# Patient Record
Sex: FEMALE | Race: WHITE | NOT HISPANIC OR LATINO
[De-identification: names, ages, dates, MRNs, and addresses within clinical notes are randomized per-mention and may not be internally consistent; named-entity substitution may affect disease eponyms.]

---
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
## 2021-01-20 ENCOUNTER — APPOINTMENT (OUTPATIENT)
Dept: PEDIATRICS | Facility: CLINIC | Age: 21
End: 2021-01-20
Payer: COMMERCIAL

## 2021-01-20 VITALS
BODY MASS INDEX: 27.16 KG/M2 | DIASTOLIC BLOOD PRESSURE: 60 MMHG | WEIGHT: 171 LBS | HEIGHT: 66.5 IN | SYSTOLIC BLOOD PRESSURE: 102 MMHG

## 2021-01-20 PROCEDURE — 99395 PREV VISIT EST AGE 18-39: CPT

## 2021-01-20 PROCEDURE — 99072 ADDL SUPL MATRL&STAF TM PHE: CPT

## 2021-01-20 RX ORDER — MUPIROCIN 20 MG/G
2 OINTMENT TOPICAL TWICE DAILY
Qty: 1 | Refills: 2 | Status: COMPLETED | COMMUNITY
Start: 2020-06-04 | End: 2021-01-20

## 2021-01-20 RX ORDER — AZITHROMYCIN 250 MG/1
250 TABLET, FILM COATED ORAL
Qty: 1 | Refills: 0 | Status: COMPLETED | COMMUNITY
Start: 2020-03-27 | End: 2021-01-20

## 2021-01-20 NOTE — HISTORY OF PRESENT ILLNESS
[Painful Cramps] : no painful cramps [Eats meals with family] : does not eat meals with family [Has friends] : does not have friends [Uses electronic nicotine delivery system] : does not use electronic nicotine delivery system [Uses tobacco] : does not use tobacco [Uses drugs] : does not use drugs  [Drinks alcohol] : does not drink alcohol [Impaired/distracted driving] : no impaired/distracted driving [de-identified] : E [de-identified] : [place] [de-identified] : occsssionally [FreeTextEntry1] : 21 yo for HM visit, immunizations UTD

## 2021-01-20 NOTE — DISCUSSION/SUMMARY
[Transferred health records?] : Transferred health records? No [FreeTextEntry1] : 19 yo for  visit immunizations UTD\par no desire to transition, comfortable here\par PE appears overweight\par exam is otherwise unremarkable\par Discussed 5210 as diet plan( willing to try)\par Continue Covid precautions\par Questions answered\par

## 2021-03-19 ENCOUNTER — APPOINTMENT (OUTPATIENT)
Dept: PEDIATRICS | Facility: CLINIC | Age: 21
End: 2021-03-19
Payer: COMMERCIAL

## 2021-03-19 VITALS — TEMPERATURE: 98.7 F

## 2021-03-19 DIAGNOSIS — S86.812A STRAIN OF OTHER MUSCLE(S) AND TENDON(S) AT LOWER LEG LEVEL, LEFT LEG, INITIAL ENCOUNTER: ICD-10-CM

## 2021-03-19 PROCEDURE — 99072 ADDL SUPL MATRL&STAF TM PHE: CPT

## 2021-03-19 PROCEDURE — 99213 OFFICE O/P EST LOW 20 MIN: CPT

## 2021-03-19 NOTE — PHYSICAL EXAM
[No Acute Distress] : no acute distress [Alert] : alert [Normocephalic] : normocephalic [Luke: ____] : Luke [unfilled] [Normal External Genitalia] : normal external genitalia [Moves All Extremities x 4] : moves all extremities x4 [NL] : warm [Warm] : warm [Dry] : dry [de-identified] : many localized areas of spasms of L ant tibial muscle

## 2021-03-19 NOTE — DISCUSSION/SUMMARY
[FreeTextEntry1] : L knee pain for x 1 week during and after running\par running x 2 mos\par PE unremarkable except for\par palpable areas of localized spasms along L Ant Tibial Muscle from origin to insertion\par mild pain on palpation of of menisci of both knees\par  showed anatomical pictures of muscles of anterior foreleg\par suggest NSAIDs ice after running warm moist compresses at night\par refer for PT\par If symptoms worsen or concerned, call/return to office.\par Questions answered.\par

## 2021-03-19 NOTE — REVIEW OF SYSTEMS
[Negative] : Genitourinary [FreeTextEntry1] : many localized areas of  spasms of L ant tibial muscle

## 2021-03-19 NOTE — HISTORY OF PRESENT ILLNESS
[FreeTextEntry6] : L knee pain x 1 week pain starts at knee and radiates towards ankle\par started running 2  mos ago

## 2021-04-19 ENCOUNTER — APPOINTMENT (OUTPATIENT)
Dept: PEDIATRICS | Facility: CLINIC | Age: 21
End: 2021-04-19
Payer: COMMERCIAL

## 2021-04-19 VITALS — WEIGHT: 166 LBS | TEMPERATURE: 97.6 F

## 2021-04-19 DIAGNOSIS — J02.9 ACUTE PHARYNGITIS, UNSPECIFIED: ICD-10-CM

## 2021-04-19 DIAGNOSIS — J35.8 OTHER CHRONIC DISEASES OF TONSILS AND ADENOIDS: ICD-10-CM

## 2021-04-19 PROCEDURE — 99072 ADDL SUPL MATRL&STAF TM PHE: CPT

## 2021-04-19 PROCEDURE — 99213 OFFICE O/P EST LOW 20 MIN: CPT

## 2021-04-19 NOTE — PHYSICAL EXAM
[Nonerythematous Oropharynx] : nonerythematous oropharynx [NL] : warm [de-identified] : 2 tonsilloliths apex L tonsil

## 2021-04-19 NOTE — DISCUSSION/SUMMARY
[FreeTextEntry1] : sore throat,white spots on tonsils\par PE unremarkable except for 2 tonsilloliths apex L tonsil\par tried flicking them out, unsuccessful\par reassured they are harmless except for Halitosis\par Suggest eating crusty bread or hard pretzels\par gargling may help\par Continue Covid precautions\par Questions answered\par

## 2021-06-10 ENCOUNTER — TRANSCRIPTION ENCOUNTER (OUTPATIENT)
Age: 21
End: 2021-06-10

## 2021-09-20 ENCOUNTER — TRANSCRIPTION ENCOUNTER (OUTPATIENT)
Age: 21
End: 2021-09-20

## 2021-09-29 ENCOUNTER — APPOINTMENT (OUTPATIENT)
Dept: PEDIATRICS | Facility: CLINIC | Age: 21
End: 2021-09-29
Payer: COMMERCIAL

## 2021-09-29 VITALS — TEMPERATURE: 97.7 F | WEIGHT: 160 LBS

## 2021-09-29 DIAGNOSIS — J18.9 PNEUMONIA, UNSPECIFIED ORGANISM: ICD-10-CM

## 2021-09-29 PROCEDURE — 99214 OFFICE O/P EST MOD 30 MIN: CPT

## 2021-09-29 NOTE — PHYSICAL EXAM
[NL] : nonerythematous oropharynx [Regular Rate and Rhythm] : regular rate and rhythm [Normal S1, S2 audible] : normal S1, S2 audible [No Murmurs] : no murmurs [FreeTextEntry5] : conjunctiva clear  [FreeTextEntry7] : slightly diminished breath sounds over left lower lung field [FreeTextEntry8] : pain reproducible when palpating L side of rib cage

## 2021-09-29 NOTE — HISTORY OF PRESENT ILLNESS
[FreeTextEntry6] : Diagnosed with COVID 10 days ago; today is day 10 of quarantine.  Has been experiencing rib pain especially worse when coughing, more prominent over L side .  Had a CXR when first diagnosed with COVID that was normal.  \par \par Had congestion, cough and fever x 2 days at beginning of illness.  Now has persistent cough.

## 2021-09-29 NOTE — REVIEW OF SYSTEMS
[Nasal Congestion] : nasal congestion [Chest Pain] : chest pain [Cough] : cough [Negative] : Genitourinary

## 2021-10-05 ENCOUNTER — NON-APPOINTMENT (OUTPATIENT)
Age: 21
End: 2021-10-05

## 2021-10-05 DIAGNOSIS — R07.81 PLEURODYNIA: ICD-10-CM

## 2021-12-08 ENCOUNTER — APPOINTMENT (OUTPATIENT)
Dept: PEDIATRICS | Facility: CLINIC | Age: 21
End: 2021-12-08

## 2021-12-16 ENCOUNTER — APPOINTMENT (OUTPATIENT)
Dept: PEDIATRICS | Facility: CLINIC | Age: 21
End: 2021-12-16
Payer: COMMERCIAL

## 2021-12-16 PROCEDURE — 90686 IIV4 VACC NO PRSV 0.5 ML IM: CPT

## 2021-12-16 PROCEDURE — 90471 IMMUNIZATION ADMIN: CPT

## 2022-03-23 ENCOUNTER — APPOINTMENT (OUTPATIENT)
Dept: PEDIATRICS | Facility: CLINIC | Age: 22
End: 2022-03-23
Payer: COMMERCIAL

## 2022-03-23 VITALS — WEIGHT: 161 LBS | TEMPERATURE: 96.8 F

## 2022-03-23 DIAGNOSIS — J32.9 CHRONIC SINUSITIS, UNSPECIFIED: ICD-10-CM

## 2022-03-23 DIAGNOSIS — H69.82 OTHER SPECIFIED DISORDERS OF EUSTACHIAN TUBE, LEFT EAR: ICD-10-CM

## 2022-03-23 PROCEDURE — 99214 OFFICE O/P EST MOD 30 MIN: CPT

## 2022-03-23 NOTE — PHYSICAL EXAM
[No Acute Distress] : no acute distress [Alert] : alert [Normocephalic] : normocephalic [EOMI] : EOMI [Clear TM bilaterally] : clear tympanic membranes bilaterally [Cerumen in canal] : cerumen in canal [Nontender Cervical Lymph Nodes] : nontender cervical lymph nodes [Clear to Auscultation Bilaterally] : clear to auscultation bilaterally [Tachycardia] : tachycardia [Regular Rate and Rhythm] : regular rate and rhythm [Soft] : soft [NonTender] : non tender [Non Distended] : non distended [Normal Bowel Sounds] : normal bowel sounds [No Hepatosplenomegaly] : no hepatosplenomegaly [NL] : warm [FreeTextEntry5] : allergic shiners [FreeTextEntry4] : nasal congestion, [de-identified] : pnd

## 2022-03-23 NOTE — HISTORY OF PRESENT ILLNESS
[EENT/Resp Symptoms] : EENT/RESPIRATORY SYMPTOMS [Runny nose] : runny nose [Nasal congestion] : nasal congestion [Chest congestion] : chest congestion [Cough] : cough [Clear rhinorrhea] : clear rhinorrhea [Wet cough] : wet cough [Dry cough] : dry cough [FreeTextEntry6] : sick, congested  x 2 weeks, Rx Day/ Night-quil Sudafed,Zyrtec,Claritin, Tylenol,humidifier

## 2022-03-23 NOTE — DISCUSSION/SUMMARY
[FreeTextEntry1] : sick,congestion, HA x 2 weeks,tried various OTC med no relief, ears popping\par PE afebrile, congestion\par allergic shiners\par TMs appear normal\par nasal congestion\par PND\par Chest CTA\par Sinuitis( 2 week Hx of congestion) Dysfunction of Eustachian Rube\par Suggest, Humidifier, Fluticasone nasal, Bromfed DM Augmentin 875\par If symptoms worsen or concerned, call/return to office.\par Questions answered.\par \par

## 2022-08-22 ENCOUNTER — APPOINTMENT (OUTPATIENT)
Dept: PEDIATRICS | Facility: CLINIC | Age: 22
End: 2022-08-22

## 2022-08-22 DIAGNOSIS — L73.2 HIDRADENITIS SUPPURATIVA: ICD-10-CM

## 2022-08-22 PROCEDURE — 99214 OFFICE O/P EST MOD 30 MIN: CPT

## 2022-08-22 RX ORDER — AMOXICILLIN AND CLAVULANATE POTASSIUM 875; 125 MG/1; MG/1
875-125 TABLET, COATED ORAL
Qty: 1 | Refills: 0 | Status: COMPLETED | COMMUNITY
Start: 2022-03-23 | End: 2022-08-22

## 2022-08-22 RX ORDER — AZITHROMYCIN 250 MG/1
250 TABLET, FILM COATED ORAL
Qty: 1 | Refills: 0 | Status: COMPLETED | COMMUNITY
Start: 2021-09-29 | End: 2022-08-22

## 2022-08-22 NOTE — DISCUSSION/SUMMARY
[FreeTextEntry1] : 20 yo w rash in axillae x 1 week\par shaved axillae last week developed painful rash\par PE unremarkable except for infection of axillae L>R\par Suggest Doxycycline 100 bid x 10 days, warm compresses\par discussed cause of infection\par instructed to clean area w Betadine prior to shaving\par If symptoms worsen or concerned, call/return to office.\par Questions answered.\par

## 2022-08-29 NOTE — REVIEW OF SYSTEMS
From: Zuly Robles  To: Aquilino Motta  Sent: 8/29/2022 11:44 AM CDT  Subject: Appointment to go over next steps    Hi Dr. Motta,     I just had another MRI done that Dr. Rich requested. I wanted to make an appointment with you to go over next steps. The earliest they said is September 13th. I am going on vacation for several weeks starting next week and wanted to see if I could get in to see you this week.    I finish up with my latest round of PT this Friday. I have had 2 trigger point injections by Dr. Rich.     Let me know if you can fit me in this week.    Thanks.    Zuly Robles   [Ear Pain] : ear pain [Nasal Discharge] : nasal discharge [Nasal Congestion] : nasal congestion [Negative] : Genitourinary

## 2022-12-01 ENCOUNTER — APPOINTMENT (OUTPATIENT)
Dept: PEDIATRICS | Facility: CLINIC | Age: 22
End: 2022-12-01

## 2022-12-01 VITALS
DIASTOLIC BLOOD PRESSURE: 64 MMHG | HEIGHT: 67 IN | WEIGHT: 148 LBS | BODY MASS INDEX: 23.23 KG/M2 | SYSTOLIC BLOOD PRESSURE: 118 MMHG

## 2022-12-01 DIAGNOSIS — Z23 ENCOUNTER FOR IMMUNIZATION: ICD-10-CM

## 2022-12-01 DIAGNOSIS — Z00.00 ENCOUNTER FOR GENERAL ADULT MEDICAL EXAMINATION W/OUT ABNORMAL FINDINGS: ICD-10-CM

## 2022-12-01 PROCEDURE — 96127 BRIEF EMOTIONAL/BEHAV ASSMT: CPT

## 2022-12-01 PROCEDURE — 90471 IMMUNIZATION ADMIN: CPT

## 2022-12-01 PROCEDURE — 90686 IIV4 VACC NO PRSV 0.5 ML IM: CPT

## 2022-12-01 PROCEDURE — 90715 TDAP VACCINE 7 YRS/> IM: CPT

## 2022-12-01 PROCEDURE — 99173 VISUAL ACUITY SCREEN: CPT | Mod: 59

## 2022-12-01 PROCEDURE — 90472 IMMUNIZATION ADMIN EACH ADD: CPT

## 2022-12-01 PROCEDURE — 96160 PT-FOCUSED HLTH RISK ASSMT: CPT | Mod: 59

## 2022-12-01 PROCEDURE — 99395 PREV VISIT EST AGE 18-39: CPT | Mod: 25

## 2022-12-01 RX ORDER — SPIRONOLACTONE 100 MG/1
100 TABLET ORAL
Qty: 30 | Refills: 0 | Status: COMPLETED | COMMUNITY
Start: 2022-06-23

## 2022-12-01 RX ORDER — CLASCOTERONE 1 G/100G
1 CREAM TOPICAL
Qty: 60 | Refills: 0 | Status: ACTIVE | COMMUNITY
Start: 2022-10-20

## 2022-12-01 RX ORDER — FLUTICASONE PROPIONATE 50 UG/1
50 SPRAY, METERED NASAL TWICE DAILY
Qty: 1 | Refills: 1 | Status: COMPLETED | COMMUNITY
Start: 2022-03-23 | End: 2022-12-01

## 2022-12-01 RX ORDER — DESOGESTREL/ETHINYL ESTRADIOL AND ETHINYL ESTRADIOL 21-5 (28)
0.15-0.02/0.01 KIT ORAL
Qty: 84 | Refills: 0 | Status: ACTIVE | COMMUNITY
Start: 2022-11-27

## 2022-12-01 RX ORDER — DOXYCYCLINE 100 MG/1
100 TABLET, FILM COATED ORAL
Qty: 20 | Refills: 0 | Status: COMPLETED | COMMUNITY
Start: 2022-08-22 | End: 2022-12-01

## 2022-12-01 RX ORDER — BROMPHENIRAMINE MALEATE, PSEUDOEPHEDRINE HYDROCHLORIDE, 2; 30; 10 MG/5ML; MG/5ML; MG/5ML
30-2-10 SYRUP ORAL
Qty: 180 | Refills: 0 | Status: COMPLETED | COMMUNITY
Start: 2022-03-23 | End: 2022-12-01

## 2022-12-01 RX ORDER — SPIRONOLACTONE 50 MG/1
50 TABLET ORAL
Qty: 60 | Refills: 0 | Status: ACTIVE | COMMUNITY
Start: 2022-11-16

## 2022-12-01 RX ORDER — NORETHINDRONE ACETATE AND ETHINYL ESTRADIOL AND FERROUS FUMARATE 1.5-30(21)
1.5-3 KIT ORAL
Qty: 84 | Refills: 0 | Status: COMPLETED | COMMUNITY
Start: 2022-04-01

## 2022-12-01 RX ORDER — TRETINOIN 0.25 MG/G
0.03 CREAM TOPICAL
Qty: 45 | Refills: 0 | Status: COMPLETED | COMMUNITY
Start: 2022-08-10

## 2022-12-01 RX ORDER — TRETINOIN 0.5 MG/G
0.05 CREAM TOPICAL
Qty: 45 | Refills: 0 | Status: ACTIVE | COMMUNITY
Start: 2022-10-20

## 2022-12-01 RX ORDER — TAZAROTENE 0.45 MG/G
0.04 LOTION TOPICAL
Qty: 45 | Refills: 0 | Status: ACTIVE | COMMUNITY
Start: 2022-11-17

## 2022-12-01 NOTE — PHYSICAL EXAM

## 2022-12-01 NOTE — RISK ASSESSMENT

## 2022-12-01 NOTE — DISCUSSION/SUMMARY
[] : The components of the vaccine(s) to be administered today are listed in the plan of care. The disease(s) for which the vaccine(s) are intended to prevent and the risks have been discussed with the caretaker.  The risks are also included in the appropriate vaccination information statements which have been provided to the patient's caregiver.  The caregiver has given consent to vaccinate. [Met privately with the adolescent for part of the office visit?] : Met privately with the adolescent for part of the office visit? Yes [Adolescent demonstrates understanding of his/her conditions and how to take prescribed medications?] : Adolescent demonstrates understanding of his/her conditions and how to take prescribed medications? Yes [Adolescent asks questions during each office  visit and participates in the care plan?] : Adolescent asks questions during each office visit and participates in the care plan? Yes [Adolescent is competent in independently making appointments, filling prescriptions, following up on referrals, and seeking emergency services, as needed?] : Adolescent is competent in independently making appointments, filling prescriptions, following up on referrals, and seeking emergency services, as needed? Yes [Initiated discussion about transfer to an adult healthcare provider?] : Initiated discussion about transfer to an adult healthcare provider? Yes  [Transferred health records?] : Transferred health records? No [FreeTextEntry1] : 23 yo for  visit FLu,Tdap\par no desire to transition, comfortable here\par strongly recommended MD for adults\par PE unremarkable\par Vax adm\par Labs ordered\par Continue Covid precautions\par Questions answered\par

## 2022-12-01 NOTE — HISTORY OF PRESENT ILLNESS
[Yes] : Patient goes to dentist yearly [Up to date] : Up to date [Needs Immunizations] : needs immunizations [Normal] : normal [Eats meals with family] : eats meals with family [Has friends] : has friends [Uses safety belts/safety equipment] : uses safety belts/safety equipment  [Has peer relationships free of violence] : has peer relationships free of violence [No] : Patient has not had sexual intercourse. [HIV Screening Declined] : HIV Screening Declined [Has ways to cope with stress] : has ways to cope with stress [Eats regular meals including adequate fruits and vegetables] : does not eat regular meals including adequate fruits and vegetables [Uses electronic nicotine delivery system] : does not use electronic nicotine delivery system [Uses tobacco] : does not use tobacco [Uses drugs] : does not use drugs  [Drinks alcohol] : does not drink alcohol [Impaired/distracted driving] : no impaired/distracted driving [de-identified] : self [de-identified] : Flu, Tdap [de-identified] : [employer] [place] [FreeTextEntry1] : 23 yo for HCM,Flu, Tdap